# Patient Record
Sex: MALE | Race: WHITE | NOT HISPANIC OR LATINO | Employment: OTHER | URBAN - METROPOLITAN AREA
[De-identification: names, ages, dates, MRNs, and addresses within clinical notes are randomized per-mention and may not be internally consistent; named-entity substitution may affect disease eponyms.]

---

## 2021-07-30 ENCOUNTER — OFFICE VISIT (OUTPATIENT)
Dept: SURGERY | Facility: CLINIC | Age: 81
End: 2021-07-30
Payer: MEDICARE

## 2021-07-30 DIAGNOSIS — L72.3 SEBACEOUS CYST: Primary | ICD-10-CM

## 2021-07-30 PROCEDURE — 11402 EXC TR-EXT B9+MARG 1.1-2 CM: CPT | Performed by: SURGERY

## 2021-07-30 PROCEDURE — 99202 OFFICE O/P NEW SF 15 MIN: CPT | Mod: 25 | Performed by: SURGERY

## 2021-07-30 NOTE — LETTER
7/30/2021         RE: Hector Bernal   [address] [facility] [address]        Dear Colleague,    Thank you for referring your patient, Hector Bernal, to the [facility] [place]. Please see a copy of my visit note below.    HPI: Pt is here with concerns about a subcutaneous mass located on his left buttock.  It has been present for 8 years but only recently has become painful.  The patient was started on antibiotics 5 days ago.  The lesion has not opened up and drained..   This lesion is not tender any longer but I did hurt for he was started on antibiotics..      Allergies:Patient has no allergy information on record.    No past medical history on file.    No past surgical history on file.    REVIEW OF SYSTEMS:  10 point ROS is negative except for; as mentioned above.    CURRENT MEDS:  No current outpatient medications on file.    There were no vitals taken for this visit.  There is no height or weight on file to calculate BMI.    EXAM:  GENERAL:Well developed he appears his stated age  HEAD & NECK: Extraocular motions intact, anicteric sclera,  ABDOMEN: Soft and nondistended, positive bowel sounds  LUNGS:  CTA  HEART:  RRR  EXTREMITIES: Full mobility,   INTEGUMENT: The patient has a subcutaneous lesion located on his left buttock.   The lesion is 2 cm cm wide.    Assessment/Plan: The pt has a 2 cm cm  subcutaneous lesion located on the on the left buttock.  This lesion appears consistent with a sebaceous cyst that has recently been infected.  I recommend this lesion is removed.      Procedure note:  Preop diagnosis is sebaceous cyst  Postop diagnosis: Sebaceous cyst  Procedure performed is excision of sebaceous cyst and the cyst itself is 2 cm in diameter.    The patient is brought into the clinic he is placed in a lateral position he is prepped with Betadine a field block with 1% lidocaine is established with subcuticular and subcutaneous injection.  Once testing  that the skin was numbed the lesion was excised with an elliptical incision that is approximately 2 inches in length.  The subcutaneous tissues were dissected through sharply and the lesion was excised.  The subcutaneous bleeders were identified on the posterior aspect of the wound these were oversewn with figure-of-eight 4-0 Vicryl sutures.  The subtenons tissues were drawn together with 4-0 Vicryl suture.  The skin was closed with running 4 oh subicular Monocryl suture.  The wound was further reinforced with 2 interrupted 3-0 Ethibond sutures placed in a vertical mattress fashion.    Wound is dressed with gauze and Tegaderm.    Patient was discharged in clinic.    Adria Gomez MD ,MD  [phone]  [facility] Department of Surgery      Again, thank you for allowing me to participate in the care of your patient.        Sincerely,        Adria Gomez MD     Clothing

## 2021-07-30 NOTE — PROGRESS NOTES
HPI: Pt is here with concerns about a subcutaneous mass located on his left buttock.  It has been present for 8 years but only recently has become painful.  The patient was started on antibiotics 5 days ago.  The lesion has not opened up and drained..   This lesion is not tender any longer but I did hurt for he was started on antibiotics..      Allergies:Patient has no allergy information on record.    No past medical history on file.    No past surgical history on file.    REVIEW OF SYSTEMS:  10 point ROS is negative except for; as mentioned above.    CURRENT MEDS:  No current outpatient medications on file.    There were no vitals taken for this visit.  There is no height or weight on file to calculate BMI.    EXAM:  GENERAL:Well developed he appears his stated age  HEAD & NECK: Extraocular motions intact, anicteric sclera,  ABDOMEN: Soft and nondistended, positive bowel sounds  LUNGS:  CTA  HEART:  RRR  EXTREMITIES: Full mobility,   INTEGUMENT: The patient has a subcutaneous lesion located on his left buttock.   The lesion is 2 cm cm wide.    Assessment/Plan: The pt has a 2 cm cm  subcutaneous lesion located on the on the left buttock.  This lesion appears consistent with a sebaceous cyst that has recently been infected.  I recommend this lesion is removed.      Procedure note:  Preop diagnosis is sebaceous cyst  Postop diagnosis: Sebaceous cyst  Procedure performed is excision of sebaceous cyst and the cyst itself is 2 cm in diameter.    The patient is brought into the clinic he is placed in a lateral position he is prepped with Betadine a field block with 1% lidocaine is established with subcuticular and subcutaneous injection.  Once testing that the skin was numbed the lesion was excised with an elliptical incision that is approximately 2 inches in length.  The subcutaneous tissues were dissected through sharply and the lesion was excised.  The subcutaneous bleeders were identified on the posterior aspect of  the wound these were oversewn with figure-of-eight 4-0 Vicryl sutures.  The subtenons tissues were drawn together with 4-0 Vicryl suture.  The skin was closed with running 4 oh subicular Monocryl suture.  The wound was further reinforced with 2 interrupted 3-0 Ethibond sutures placed in a vertical mattress fashion.    Wound is dressed with gauze and Tegaderm.    Patient was discharged in clinic.    Adria Gomez MD ,MD  [phone]  [facility] Department of Surgery